# Patient Record
Sex: MALE | Race: WHITE
[De-identification: names, ages, dates, MRNs, and addresses within clinical notes are randomized per-mention and may not be internally consistent; named-entity substitution may affect disease eponyms.]

---

## 2018-11-17 ENCOUNTER — HOSPITAL ENCOUNTER (EMERGENCY)
Dept: HOSPITAL 25 - ED | Age: 28
Discharge: HOME | End: 2018-11-17
Payer: COMMERCIAL

## 2018-11-17 VITALS — SYSTOLIC BLOOD PRESSURE: 148 MMHG | DIASTOLIC BLOOD PRESSURE: 98 MMHG

## 2018-11-17 DIAGNOSIS — K76.89: ICD-10-CM

## 2018-11-17 DIAGNOSIS — F17.210: ICD-10-CM

## 2018-11-17 DIAGNOSIS — R10.13: Primary | ICD-10-CM

## 2018-11-17 DIAGNOSIS — R19.7: ICD-10-CM

## 2018-11-17 DIAGNOSIS — R11.2: ICD-10-CM

## 2018-11-17 LAB
BASOPHILS # BLD AUTO: 0 10^3/UL (ref 0–0.2)
EOSINOPHIL # BLD AUTO: 0.1 10^3/UL (ref 0–0.6)
HCT VFR BLD AUTO: 49 % (ref 42–52)
HGB BLD-MCNC: 17.1 G/DL (ref 14–18)
LYMPHOCYTES # BLD AUTO: 2.3 10^3/UL (ref 1–4.8)
MCH RBC QN AUTO: 30 PG (ref 27–31)
MCHC RBC AUTO-ENTMCNC: 35 G/DL (ref 31–36)
MCV RBC AUTO: 86 FL (ref 80–94)
MONOCYTES # BLD AUTO: 0.8 10^3/UL (ref 0–0.8)
NEUTROPHILS # BLD AUTO: 4.9 10^3/UL (ref 1.5–7.7)
NRBC # BLD AUTO: 0 10^3/UL
NRBC BLD QL AUTO: 0.1
PLATELET # BLD AUTO: 333 10^3/UL (ref 150–450)
RBC # BLD AUTO: 5.67 10^6/UL (ref 4–5.4)
WBC # BLD AUTO: 8.1 10^3/UL (ref 3.5–10.8)

## 2018-11-17 PROCEDURE — 36415 COLL VENOUS BLD VENIPUNCTURE: CPT

## 2018-11-17 PROCEDURE — 86140 C-REACTIVE PROTEIN: CPT

## 2018-11-17 PROCEDURE — 81015 MICROSCOPIC EXAM OF URINE: CPT

## 2018-11-17 PROCEDURE — 71046 X-RAY EXAM CHEST 2 VIEWS: CPT

## 2018-11-17 PROCEDURE — 80053 COMPREHEN METABOLIC PANEL: CPT

## 2018-11-17 PROCEDURE — 96375 TX/PRO/DX INJ NEW DRUG ADDON: CPT

## 2018-11-17 PROCEDURE — 83605 ASSAY OF LACTIC ACID: CPT

## 2018-11-17 PROCEDURE — 96361 HYDRATE IV INFUSION ADD-ON: CPT

## 2018-11-17 PROCEDURE — 81003 URINALYSIS AUTO W/O SCOPE: CPT

## 2018-11-17 PROCEDURE — 85379 FIBRIN DEGRADATION QUANT: CPT

## 2018-11-17 PROCEDURE — 99282 EMERGENCY DEPT VISIT SF MDM: CPT

## 2018-11-17 PROCEDURE — 96374 THER/PROPH/DIAG INJ IV PUSH: CPT

## 2018-11-17 PROCEDURE — 83690 ASSAY OF LIPASE: CPT

## 2018-11-17 PROCEDURE — 85025 COMPLETE CBC W/AUTO DIFF WBC: CPT

## 2018-11-17 PROCEDURE — 76705 ECHO EXAM OF ABDOMEN: CPT

## 2018-11-17 PROCEDURE — 84484 ASSAY OF TROPONIN QUANT: CPT

## 2018-11-17 PROCEDURE — 86308 HETEROPHILE ANTIBODY SCREEN: CPT

## 2018-11-17 PROCEDURE — 93005 ELECTROCARDIOGRAM TRACING: CPT

## 2018-11-17 NOTE — ED
GI/ HPI





- HPI Summary


HPI Summary: 


28-year-old male presents with nausea vomiting diarrhea for the past couple 

weeks.  He admits to epigastric pain.  No blood in his stool.  States has not 

been able to keep anything down.  Has been on omeprazole chronically.  Has a 

history of acid reflux.  Was told to stop ibuprofen.  He was seen at [facility] 3 

times and negative CT and ultrasound.  symptoms have not been improving.  He 

admits to some right-sided chest pain and shortness breath.  He has history of 

asthma.  No fevers.  No sore throat.  He denies any urinary symptoms.  No flank 

pain.  No testicular pain.  Pain is sharp in nature.  Pain has not changed 

locations.








- History of Current Complaint


Chief Complaint: EDAbdPain


Time Seen by Provider: 11/17/18 11:43


Stated Complaint: ABD PAIN/VOMITING


Pain Intensity: 4





- Additional Pertinent History


Primary Care Physician: [id number]





- Allergy/Home Medications


Allergies/Adverse Reactions: 


 Allergies











Allergy/AdvReac Type Severity Reaction Status Date / Time


 


acetaminophen [From NyQuil] Allergy  Hallucinati Verified 11/17/18 11:41





   ons  


 


dextromethorphan Allergy  Hallucinati Verified 11/17/18 11:41





[From NyQuil]   ons  


 


doxylamine [From NyQuil] Allergy  Hallucinati Verified 11/17/18 11:41





   ons  


 


latex Allergy  Unknown Verified 11/17/18 11:41





   Reaction  





   Details  


 


pseudoephedrine [From NyQuil] Allergy  Hallucinati Verified 11/17/18 11:41





   ons  














PMH/Surg Hx/FS Hx/Imm Hx


Endocrine/Hematology History: 


   Denies: Hx Diabetes


Cardiovascular History: 


   Denies: Hx Hypertension, Hx Pacemaker/ICD


Respiratory History: Reports: Hx Asthma


 History: 


   Denies: Hx Renal Disease


Sensory History: 


   Denies: Hx Hearing Aid


Neurological History: Reports: Hx Seizures


Psychiatric History: Reports: Hx of Violent Episodes Against Others


   Denies: Hx Eating Disorder, Hx Panic Disorder


Infectious Disease History: No


Infectious Disease History: 


   Denies: Traveled Outside the [place] in Last 30 Days





- Family History


Known Family History: Positive: Other - no hx of crohn or UC





- Social History


Alcohol Use: None


Substance Use Type: Reports: None


Smoking Status (MU): Heavy Every Day Tobacco Smoker


Type: Cigarettes


Amount Used/How Often: 1/2 PPD


Have You Smoked in the Last Year: Yes





Review of Systems


Negative: Fever


Negative: Chest Pain


Negative: Shortness Of Breath


Positive: Abdominal Pain, Vomiting, Diarrhea, Nausea


All Other Systems Reviewed And Are Negative: Yes





Physical Exam


Triage Information Reviewed: Yes


Vital Signs On Initial Exam: 


 Initial Vitals











Temp Pulse Resp BP Pulse Ox


 


 97.8 F   79   18   133/92   99 


 


 11/17/18 11:34  11/17/18 11:34  11/17/18 11:34  11/17/18 11:34  11/17/18 11:34











Vital Signs Reviewed: Yes


Appearance: Positive: Well-Appearing


Skin: Positive: Warm, Dry


Head/Face: Positive: Normal Head/Face Inspection


Eyes: Positive: Normal, EOMI, YANA, Conjunctiva Clear


ENT: Positive: Normal ENT inspection, Pharynx normal, TMs normal


Neck: Positive: Supple, Nontender, No Lymphadenopathy


Respiratory/Lung Sounds: Positive: Clear to Auscultation, Breath Sounds Present


Cardiovascular: Positive: Normal, RRR


Abdomen Description: Positive: Soft, Other: - tenderness in RUQ, no rebound


Bowel Sounds: Positive: Present


Musculoskeletal: Positive: Normal


Neurological: Positive: Normal


Psychiatric: Positive: Normal





Diagnostics





- Vital Signs


 Vital Signs











  Temp Pulse Resp BP Pulse Ox


 


 11/17/18 12:03  97.5 F    


 


 11/17/18 11:34  97.8 F  79  18  133/92  99














- Laboratory


Lab Results: 


 Lab Results











  11/17/18 11/17/18 Range/Units





  11:48 11:57 


 


WBC  8.1   (3.5-10.8)  10^3/ul


 


RBC  5.67 H   (4.00-5.40)  10^6/ul


 


Hgb  17.1   (14.0-18.0)  g/dl


 


Hct  49   (42-52)  %


 


MCV  86   (80-94)  fL


 


MCH  30   (27-31)  pg


 


MCHC  35   (31-36)  g/dl


 


RDW  13   (10.5-15)  %


 


Plt Count  333   (150-450)  10^3/ul


 


MPV  8.1   (7.4-10.4)  fL


 


Neut % (Auto)  59.9   (38-83)  %


 


Lymph % (Auto)  28.8   (25-47)  %


 


Mono % (Auto)  9.5 H   (0-7)  %


 


Eos % (Auto)  1.3   (0-6)  %


 


Baso % (Auto)  0.5   (0-2)  %


 


Absolute Neuts (auto)  4.9   (1.5-7.7)  10^3/ul


 


Absolute Lymphs (auto)  2.3   (1.0-4.8)  10^3/ul


 


Absolute Monos (auto)  0.8   (0-0.8)  10^3/ul


 


Absolute Eos (auto)  0.1   (0-0.6)  10^3/ul


 


Absolute Basos (auto)  0   (0-0.2)  10^3/ul


 


Absolute Nucleated RBC  0   10^3/ul


 


Nucleated RBC %  0.1   


 


Urine Color   Yesica  


 


Urine Appearance   Cloudy  


 


Urine pH   5.0  (5-9)  


 


Ur Specific Gravity   1.026  (1.010-1.030)  


 


Urine Protein   1+(30 mg/dl) A  (Negative)  


 


Urine Ketones   2+ A  (Negative)  


 


Urine Blood   Negative  (Negative)  


 


Urine Nitrate   Negative  (Negative)  


 


Urine Bilirubin   Negative  (Negative)  


 


Urine Urobilinogen   Negative  (Negative)  


 


Ur Leukocyte Esterase   Negative  (Negative)  


 


Urine Glucose   Negative  (Negative)  











Result Diagrams: 


 11/17/18 11:48





 11/17/18 11:48


Lab Statement: Any lab studies that have been ordered have been reviewed, and 

results considered in the medical decision making process.





- Radiology


  ** chest


Radiology Interpretation Completed By: Radiologist


Summary of Radiographic Findings: IMPRESSION:  #. Stigmata of obstructive lung 

disease. No acute pulmonary or cardiac process evident.





- Ultrasound


  ** No standard instances


Ultrasound Interpretation Completed By: Radiologist


Summary of Ultrasound Findings: IMPRESSION:  #. Hepatosteatosis.  #. Negative 

for gallbladder pathology.





- EKG


  ** No standard instances


Cardiac Rate: NL


EKG Rhythm: Sinus Rhythm


Summary of EKG Findings: sinus rhythm, early repolarization





Re-Evaluation





- Re-Evaluation


  ** First Eval


Re-Evaluation Time: 13:07


Change: Improved


Comment: still a little nauseous





  ** Second Eval


Re-Evaluation Time: 14:53


Change: Improved


Comment: tolerated crackers and no pain





GIGU Course/Dx





- Course


Course Of Treatment: 28-year-old male presents with nausea vomiting diarrhea 

for the past couple weeks.  He admits to epigastric pain.  No blood in his 

stool.  States has not been able to keep anything down.  Has been on omeprazole 

chronically.  Has a history of acid reflux.  Was told to stop ibuprofen.  He 

was seen at [facility] 3 times and negative CT and ultrasound.  symptoms have not 

been improving.  He admits to some right-sided chest pain and shortness breath.

  He has history of asthma.  No fevers.  No sore throat.  He denies any urinary 

symptoms.  No flank pain.  No testicular pain.  Pain is sharp in nature.  Pain 

has not changed locations.  On exam lungs clear to auscultation.  Has 

tenderness in epigastric region.  No rebound.  Asthma blood cell count normal.  

CRP normal.  Urine shows ketones.  Gave fluids and Zofran and had some 

improvement. got CT from [facility] per report shows no acute intrabdominal 

pathlogy. ekg shows sinus rhythm. chest xray normal. gallbladder u/s normal. 

gave fluids and reglan and feeling better. able to tolerate crackers. has 

follow up with GI. has compazine at pharmacy can use. patient understand and 

agrees with plan.





- Diagnoses


Differential Diagnoses - Male: Gastritis, Gastroenteritis (Viral), Urinary 

Tract Infection


Provider Diagnoses: 


 Epigastric pain, Nausea vomiting and diarrhea








Discharge





- Sign-Out/Discharge


Documenting (check all that apply): Patient Departure





- Discharge Plan


Condition: Good


Disposition: HOME


Patient Education Materials:  Acute Nausea and Vomiting (ED)


Referrals: 


Chelly Marrufo MD [Primary Care Provider] - 


Additional Instructions: 


Drink small amounts of fluid as tolerated


When able to eat follow BRAT diet: Bananas, rice, applesauce, toast


Take ibuprofen or Tylenol for pain as needed every 6 hours


Follow up with primary within 5 days


Return to ED if develop any new or worsening symptoms





- Billing Disposition and Condition


Condition: GOOD


Disposition: Home